# Patient Record
Sex: FEMALE | Race: WHITE | NOT HISPANIC OR LATINO
[De-identification: names, ages, dates, MRNs, and addresses within clinical notes are randomized per-mention and may not be internally consistent; named-entity substitution may affect disease eponyms.]

---

## 2020-07-09 PROBLEM — Z00.00 ENCOUNTER FOR PREVENTIVE HEALTH EXAMINATION: Status: ACTIVE | Noted: 2020-07-09

## 2020-07-10 ENCOUNTER — APPOINTMENT (OUTPATIENT)
Dept: SURGICAL ONCOLOGY | Facility: CLINIC | Age: 20
End: 2020-07-10
Payer: COMMERCIAL

## 2020-07-10 VITALS
RESPIRATION RATE: 16 BRPM | HEART RATE: 69 BPM | SYSTOLIC BLOOD PRESSURE: 138 MMHG | OXYGEN SATURATION: 98 % | HEIGHT: 64 IN | DIASTOLIC BLOOD PRESSURE: 82 MMHG | BODY MASS INDEX: 22.71 KG/M2 | WEIGHT: 133 LBS

## 2020-07-10 VITALS — TEMPERATURE: 96.1 F

## 2020-07-10 DIAGNOSIS — N63.0 UNSPECIFIED LUMP IN UNSPECIFIED BREAST: ICD-10-CM

## 2020-07-10 PROCEDURE — 99205 OFFICE O/P NEW HI 60 MIN: CPT

## 2020-07-10 RX ORDER — CEFADROXIL 500 MG/1
500 CAPSULE ORAL TWICE DAILY
Qty: 28 | Refills: 3 | Status: ACTIVE | COMMUNITY
Start: 2020-07-10 | End: 1900-01-01

## 2020-07-10 NOTE — ASSESSMENT
[FreeTextEntry1] : IMP:\par Infected sebaceous cyst left infra-mammary fold\par \par Plan:\par Warm soaks\par Duricef 500 mg BID\par RTO in 2 weeks to discus surgical excision

## 2020-07-10 NOTE — PHYSICAL EXAM
[Normal] : supple, no neck mass and thyroid not enlarged [Normal Supraclavicular Lymph Nodes] : normal supraclavicular lymph nodes [Normal Axillary Lymph Nodes] : normal axillary lymph nodes [Normal] : grossly intact [de-identified] : 1.5 cm red mass in left infra-mammary fold medially; No masses in the breast itself; sonogramshows a complex cystic lesion consistent with an infected sebaceous cyst

## 2020-07-10 NOTE — HISTORY OF PRESENT ILLNESS
[de-identified] : 21 YO female with left infra-mammary fold painful mass. She has been exercising and sweating a lot.  No prior breast masses or nipple discharge.

## 2020-07-23 ENCOUNTER — APPOINTMENT (OUTPATIENT)
Dept: SURGICAL ONCOLOGY | Facility: CLINIC | Age: 20
End: 2020-07-23
Payer: COMMERCIAL

## 2020-07-23 VITALS
HEART RATE: 69 BPM | BODY MASS INDEX: 22.88 KG/M2 | OXYGEN SATURATION: 98 % | SYSTOLIC BLOOD PRESSURE: 125 MMHG | RESPIRATION RATE: 16 BRPM | WEIGHT: 134 LBS | HEIGHT: 64 IN | DIASTOLIC BLOOD PRESSURE: 77 MMHG

## 2020-07-23 DIAGNOSIS — L72.3 SEBACEOUS CYST: ICD-10-CM

## 2020-07-23 PROCEDURE — 99214 OFFICE O/P EST MOD 30 MIN: CPT

## 2020-07-23 NOTE — ASSESSMENT
[FreeTextEntry1] : IMP:\par Infected sebaceous cyst left infra-mammary fold\par \par Plan:\par Excision as am ambulatory patient\par

## 2020-07-23 NOTE — HISTORY OF PRESENT ILLNESS
[de-identified] : 19 YO female presented on 7/10/2020 for initial consultation with a left infra-mammary fold painful mass. She had been exercising and sweating a lot.  No prior breast masses or nipple discharge.\par \par On examination she was noted with and infected sebaceous cyst in the left infra-mammary fold.  She was started on Duricef 500 mg BID with relief noted.\par \par

## 2020-07-23 NOTE — PHYSICAL EXAM
[Normal] : supple, no neck mass and thyroid not enlarged [Normal Supraclavicular Lymph Nodes] : normal supraclavicular lymph nodes [Normal Axillary Lymph Nodes] : normal axillary lymph nodes [Normal] : oriented to person, place and time, with appropriate affect [de-identified] : Sebaceous cyst in left infra-mammary fold medially has a punctum but no residual infection at this point

## 2020-08-04 ENCOUNTER — OUTPATIENT (OUTPATIENT)
Dept: OUTPATIENT SERVICES | Facility: HOSPITAL | Age: 20
LOS: 1 days | End: 2020-08-04

## 2020-08-04 VITALS
TEMPERATURE: 100 F | HEART RATE: 88 BPM | HEIGHT: 63 IN | SYSTOLIC BLOOD PRESSURE: 126 MMHG | OXYGEN SATURATION: 98 % | RESPIRATION RATE: 17 BRPM | WEIGHT: 141.98 LBS | DIASTOLIC BLOOD PRESSURE: 82 MMHG

## 2020-08-04 DIAGNOSIS — N63.0 UNSPECIFIED LUMP IN UNSPECIFIED BREAST: ICD-10-CM

## 2020-08-04 DIAGNOSIS — N63.20 UNSPECIFIED LUMP IN THE LEFT BREAST, UNSPECIFIED QUADRANT: ICD-10-CM

## 2020-08-04 LAB — HCG SERPL-ACNC: < 5 MIU/ML — SIGNIFICANT CHANGE UP

## 2020-08-04 NOTE — H&P PST ADULT - NSANTHOSAYNRD_GEN_A_CORE
No. KAY screening performed.  STOP BANG Legend: 0-2 = LOW Risk; 3-4 = INTERMEDIATE Risk; 5-8 = HIGH Risk

## 2020-08-04 NOTE — H&P PST ADULT - NSICDXPROBLEM_GEN_ALL_CORE_FT
PROBLEM DIAGNOSES  Problem: Left breast mass  Assessment and Plan: Pt. is scheduled for a left breast mass excision 8/12/20.  Pt. verbalized understanding of instructions and that Chlorhexidine is for external use.  Pt. is scheduled for COVID test 8/9/20.  Pt. started menses today and was unable to give enough urine.  Therefore, HCG was sent.

## 2020-08-08 DIAGNOSIS — Z01.818 ENCOUNTER FOR OTHER PREPROCEDURAL EXAMINATION: ICD-10-CM

## 2020-08-09 ENCOUNTER — APPOINTMENT (OUTPATIENT)
Dept: DISASTER EMERGENCY | Facility: CLINIC | Age: 20
End: 2020-08-09

## 2020-08-10 LAB — SARS-COV-2 N GENE NPH QL NAA+PROBE: NOT DETECTED

## 2020-08-11 ENCOUNTER — TRANSCRIPTION ENCOUNTER (OUTPATIENT)
Age: 20
End: 2020-08-11

## 2020-08-11 VITALS
SYSTOLIC BLOOD PRESSURE: 11 MMHG | WEIGHT: 141.98 LBS | OXYGEN SATURATION: 100 % | DIASTOLIC BLOOD PRESSURE: 72 MMHG | RESPIRATION RATE: 16 BRPM | HEART RATE: 68 BPM | TEMPERATURE: 98 F | HEIGHT: 63 IN

## 2020-08-12 ENCOUNTER — RESULT REVIEW (OUTPATIENT)
Age: 20
End: 2020-08-12

## 2020-08-12 ENCOUNTER — OUTPATIENT (OUTPATIENT)
Dept: OUTPATIENT SERVICES | Facility: HOSPITAL | Age: 20
LOS: 1 days | Discharge: HOME | End: 2020-08-12
Payer: COMMERCIAL

## 2020-08-12 ENCOUNTER — APPOINTMENT (OUTPATIENT)
Dept: SURGICAL ONCOLOGY | Facility: AMBULATORY SURGERY CENTER | Age: 20
End: 2020-08-12

## 2020-08-12 VITALS
DIASTOLIC BLOOD PRESSURE: 84 MMHG | SYSTOLIC BLOOD PRESSURE: 124 MMHG | RESPIRATION RATE: 16 BRPM | TEMPERATURE: 98 F | HEART RATE: 68 BPM | OXYGEN SATURATION: 99 %

## 2020-08-12 DIAGNOSIS — N63.0 UNSPECIFIED LUMP IN UNSPECIFIED BREAST: ICD-10-CM

## 2020-08-12 PROCEDURE — 19301 PARTIAL MASTECTOMY: CPT

## 2020-08-12 PROCEDURE — 88304 TISSUE EXAM BY PATHOLOGIST: CPT | Mod: 26

## 2020-08-12 RX ORDER — SODIUM CHLORIDE 9 MG/ML
1000 INJECTION, SOLUTION INTRAVENOUS
Refills: 0 | Status: DISCONTINUED | OUTPATIENT
Start: 2020-08-12 | End: 2020-08-27

## 2020-08-12 RX ORDER — DEXTROAMPHETAMINE SACCHARATE, AMPHETAMINE ASPARTATE, DEXTROAMPHETAMINE SULFATE AND AMPHETAMINE SULFATE 1.875; 1.875; 1.875; 1.875 MG/1; MG/1; MG/1; MG/1
1 TABLET ORAL
Qty: 0 | Refills: 0 | DISCHARGE

## 2020-08-12 NOTE — ASU DISCHARGE PLAN (ADULT/PEDIATRIC) - CARE PROVIDER_API CALL
Milton Burton  SURGERY  [id number] [address] [place] [id number]  Phone: [phone]  Fax: [phone]  Follow Up Time:

## 2020-08-12 NOTE — ASU DISCHARGE PLAN (ADULT/PEDIATRIC) - ASU DC SPECIAL INSTRUCTIONSFT
Follow up:   Please call the office and schedule an appointment to see Dr. Burton in 2 weeks.    Pain management:  You make take Tylenol 325mg or 650mg by mouth every 4 hours while pain persists.     Wound care:  You may take off the tape/gauze dressing tomorrow and shower at that time. Do not remove steri strips; they will fall off on their own.     Diet:  You may resume your regular diet.

## 2020-08-12 NOTE — BRIEF OPERATIVE NOTE - OPERATION/FINDINGS
Left breast lump excised with overlying skin island at the inframammary fold, along the left sternal border.

## 2020-08-20 LAB — SURGICAL PATHOLOGY STUDY: SIGNIFICANT CHANGE UP

## 2023-10-02 ENCOUNTER — NON-APPOINTMENT (OUTPATIENT)
Age: 23
End: 2023-10-02

## 2025-01-07 ENCOUNTER — NON-APPOINTMENT (OUTPATIENT)
Age: 25
End: 2025-01-07

## 2025-07-05 ENCOUNTER — NON-APPOINTMENT (OUTPATIENT)
Age: 25
End: 2025-07-05